# Patient Record
Sex: MALE
[De-identification: names, ages, dates, MRNs, and addresses within clinical notes are randomized per-mention and may not be internally consistent; named-entity substitution may affect disease eponyms.]

---

## 2018-05-01 ENCOUNTER — HOSPITAL ENCOUNTER (OUTPATIENT)
Dept: HOSPITAL 72 - SUR | Age: 38
Discharge: HOME | End: 2018-05-01
Payer: COMMERCIAL

## 2018-05-01 VITALS — SYSTOLIC BLOOD PRESSURE: 131 MMHG | DIASTOLIC BLOOD PRESSURE: 71 MMHG

## 2018-05-01 VITALS — SYSTOLIC BLOOD PRESSURE: 118 MMHG | DIASTOLIC BLOOD PRESSURE: 73 MMHG

## 2018-05-01 VITALS — HEIGHT: 68 IN | BODY MASS INDEX: 32.43 KG/M2 | WEIGHT: 214 LBS

## 2018-05-01 VITALS — DIASTOLIC BLOOD PRESSURE: 49 MMHG | SYSTOLIC BLOOD PRESSURE: 94 MMHG

## 2018-05-01 VITALS — SYSTOLIC BLOOD PRESSURE: 106 MMHG | DIASTOLIC BLOOD PRESSURE: 54 MMHG

## 2018-05-01 VITALS — DIASTOLIC BLOOD PRESSURE: 42 MMHG | SYSTOLIC BLOOD PRESSURE: 96 MMHG

## 2018-05-01 VITALS — SYSTOLIC BLOOD PRESSURE: 92 MMHG | DIASTOLIC BLOOD PRESSURE: 41 MMHG

## 2018-05-01 VITALS — DIASTOLIC BLOOD PRESSURE: 94 MMHG | SYSTOLIC BLOOD PRESSURE: 129 MMHG

## 2018-05-01 VITALS — DIASTOLIC BLOOD PRESSURE: 44 MMHG | SYSTOLIC BLOOD PRESSURE: 94 MMHG

## 2018-05-01 VITALS — SYSTOLIC BLOOD PRESSURE: 139 MMHG | DIASTOLIC BLOOD PRESSURE: 92 MMHG

## 2018-05-01 VITALS — DIASTOLIC BLOOD PRESSURE: 85 MMHG | SYSTOLIC BLOOD PRESSURE: 148 MMHG

## 2018-05-01 DIAGNOSIS — I10: ICD-10-CM

## 2018-05-01 DIAGNOSIS — M75.42: ICD-10-CM

## 2018-05-01 DIAGNOSIS — M19.012: ICD-10-CM

## 2018-05-01 DIAGNOSIS — M75.112: Primary | ICD-10-CM

## 2018-05-01 PROCEDURE — 29823 SHO ARTHRS SRG XTNSV DBRDMT: CPT

## 2018-05-01 PROCEDURE — 29824 SHO ARTHRS SRG DSTL CLAVICLC: CPT

## 2018-05-01 NOTE — IMMEDIATE POST-OP EVALUATION
Immediate Post-Op Evalulation


Immediate Post-Op Evalulation


Procedure:  Left shoulder arthroscopy, sabina repair


Date of Evaluation:  May 1, 2018


Time of Evaluation:  10:18


IV Fluids:  1.2L


Blood Products:  0


Estimated Blood Loss:  min


Urinary Output:  0


Blood Pressure Systolic:  96


Blood Pressure Diastolic:  42


Pulse Rate:  71


Respiratory Rate:  16


O2 Sat by Pulse Oximetry:  99


Temperature (Fahrenheit):  97


Pain Score (1-10):  0


Nausea:  No


Vomiting:  No


Complications


0


Patient Status:  awake, reacts, patent, none


Hydration Status:  adequate


Drug:  Ancef 2g


Given Within 1 Hr of Incision:  Yes


Time Given:  08:30











JUAN BEASLEY M.D. May 1, 2018 08:57

## 2018-05-01 NOTE — BRIEF OPERATIVE NOTE
Immediate Post Operative Note


Operative Note


Chief Complaint:  left shoulder pain


Pre-op Diagnosis:


left shoulder impingement


Procedure:


left shoulder scope, sad, full sabina, rtc debridement


Post-op Diagnosis:  same as pre-op


Findings:  consistent w/pre-op dx studies


Surgeon:  md tricia


Assistant:  victor m arreola


Anesthesiologist:  md patti


Anesthesia:  general, regional


Specimen:  none


Complications:  none


Condition:  stable


Fluids:  ns


Estimated Blood Loss:  minimal


Drains:  none


Implant(s) used?:  No











KARIN ARREOLA May 1, 2018 10:00

## 2018-05-01 NOTE — OPERATIVE NOTE - DICTATED
DATE OF OPERATION:  05/01/2018



PREOPERATIVE DIAGNOSES:

1. Left shoulder subacromial impingement.

2. Left shoulder possible rotator cuff tear.

3. Left shoulder AC joint arthritis and pain.



POSTOPERATIVE DIAGNOSES:

1. Left shoulder articular-sided rotator cuff tear involving 15% rotator

cuff.

2. Left shoulder subacromial impingement and bursitis with a large bone

spur.

3. Left shoulder AC joint arthritis.



PROCEDURE:

1. Left shoulder arthroscopy and extensive intra-articular shaving.

2. Left shoulder debridement of the articular-sided rotator cuff tear.

3. Left shoulder subacromial bursoscopy, bursectomy, and subacromial

decompression.

4. Left shoulder full-Roya procedure.



SURGEON:  Augustus العراقي M.D.



ASSISTANT:  Catherine Hameed PA-C.



Assistant was present during the actual operative portion of the case

and was important and essential part of the operation.  During the

operation, the assistant held and operated the arthroscopic camera for

visualization, assisted by manipulating the arm to help with

visualization, and helped with essential parts of the repair process as

necessary such as operating surgical instruments under surgeon

supervision, suture management, and wound closures.



ANESTHESIOLOGIST:   _____.



ANESTHESIA:  LMA anesthesia.



ESTIMATED BLOOD LOSS:  Minimal.



COMPLICATIONS:  None.



SURGICAL INDICATION:  The patient is a 37-year-old male, who sustained the

above injury to his shoulder.  The patient was treated non-operative

initially, but this did not alleviate the patients symptoms.  Therefore,

after discussing all non-surgical and surgical options, and discussing all

foreseeable risk and benefits of surgery, the patient opted for surgical

treatment as described above.



PATIENT POSITIONING:  The patient was brought to the operating room table

and was placed on the operating room table.  All pressure points were well

padded.  General anesthesia was induced and patient was then placed in the

lateral decubitus position.  All pressure points were well padded again

and an axillary roll was placed.  Patient shoulder was then prepped and

draped in the usual sterile fashion.  Time out was performed and the

appropriate preoperative antibiotic was given by the anesthesiologist.



EXAMINATION OF SHOULDER UNDER ANESTHESIA:  The shoulder was examined under

anesthesia with all muscles well relaxed.  The shoulder was forward

flexed, abducted and was placed through full range of external and

internal rotation.  The anterior, posterior, and inferior stability of the

shoulder was checked.  The exam revealed no evidence of adhesive

capsulitis and no evidence of instability.



PORTAL PLACEMENT:  The posterior portal was established 2 cm inferior and 1

cm medial to the edge of the posterior acromion.  1 cm skin incision was

made using an eleven blade and using the blunt obturator, the cannula was

gently placed through the capsule.  The mid-glenoid portal was established

just lateral to the coracoid process under direct visualization. Direction

of the cannula was first established using a spinal needle, and

subsequently, the cannula was placed through the capsule with a blunt

obturator.



DIAGNOSTIC ARTHROSCOPY:  The biceps tendon was probed and pulled through

the joint for visualization.  It appeared normal.  The biceps anchor was

palpated with a probe and was visualized.  It appeared well attached and

there was no evidence of SLAP tear.  The posterior labrum and axillary

recess was visualized.  This was normal and there was no evidence of loose

cartilage or fragments in this area.  The glenoid articular surface was

visualized and it appeared normal.  The articular surface of the rotator

cuff was visualized and probed next.  There was articular-sided rotator

cuff tear at the leading edge involving 15% of the articular-sided rotator

cuff.   The Humeral head articular surface was then visualized.  There was

no evidence of articular cartilage damage.  Next the anterior labrum,

middle glenohumeral ligament, subscapularis tendon, and the anterior

inferior glenohumeral ligament were evaluated.  These structures were

completely normal.



At this point, the scope was moved to the mid-glenoid portal and the

posterior structures including the posterior labrum, posterior capsule and

posterior cuff were visualized.  These structures were completely normal.

The subscapularis recess was devoid of any loose bodies and the anterior

capsule was well attached to the humeral neck.  The middle and anterior

inferior glenohumeral ligament was visualized.  These structures were

completely normal.



OPERATIVE DEBRIDEMENTS AND REPAIR:  Care was given to all partial thickness

tears and frayed structures in the shoulder joint.  The frayed rotator

cuff and labrum was debrided using a shaver initially through the anterior

portal and subsequently through the posterior portal to complete the

debridement.  This allowed for smooth debridement of all affected

structures and all loose fragments were removed.



DIAGNOSTIC BURSOSCOPY AND SUBACROMIAL DECOMPRESSION:  The subacromial bursa

was entered from the posterior portal.  The anterior portal was

established under the CA ligament using a switching stick.  Subacromial

arthroscopy was initiated.  There was extensive bursitis and thickened and

inflamed bursa tissue present.  The CA ligament appeared to be scuffed and

frayed.  The shaver was placed through the anterior cannula and

debridement of the hypertrophic bursa tissue was accomplished.  Once

visualization was adequate, a lateral portal was established using a blunt

trochar in the mid portion of the acromion bone in the anterior-posterior

direction and approximately 2 cm lateral to the lateral edge of the

acromion.  Using combination of shaver and electrocautery the CA ligament

was released from the undersurface of the acromion and a complete

bursectomy was accomplished.  At this point, a subacromial decompression

was performed using a molina initially taking off 5-8 mm of the

anterolateral edge of the acromion from the lateral portal and viewing

from the posterior portal.  Then the lateral border of the undersurface of

the acromion was decompressed to the same dept as the anterolateral edge.

A posterior trough was then created in the acromion in line with the

posterior edge of the clavicle.  At this point, the scope was placed in

the lateral portal and the subacromial decompression was performed from

the posterior portal decompressing the undersurface of the  acromion to

dept of 5-8 mm.  The decompression was performed anterior to the

previously marked trough all the way medially to the level of the AC

joint.  At all times, care was given not to take off too much bone in

order to avoid risk of fracture of the acromion.  An excellent subacromial

decompression was performed in this fashion.  At this point, the bursal

side of the rotator cuff was examined.  All the bursa over the rotator

cuff was removed and the rotator cuff was examined with a probe.  The arm

was placed into external rotation, neutral, and then internal rotation and

there was no evidence of tear of the rotator cuff.  The scope was then

placed in the posterior portal and the subacromial decompression was

rechecked to assure there is no area of bone spur that would be still

impinging onto the rotator cuff.



EVALUATION OF DISTAL CLAVICLE AND DISTAL CLAVICLE RESECTION:  Care was

given to the distal end of the clavicle.  Using electrocautery and

teresa, the distal end of the bursa and soft tissue around the distal end

of the clavicle was debrided and cleaned.  Care was given not to inflict

excessive trauma to the ligaments of the AC joint.  The distal end of the

clavicle appeared to have an inferior osteophyte extending down well

bellow the level of the acromion at the level of the AC joint.  This

appeared to be impinging onto the supraspinatus muscle belly and the

musculotendinous junction of the rotator cuff.  A full-Roay procedure

was performed by using a molina to resect the inferior 30% of the distal end

of the clavicle.  This decompression allowed space for the inferior

structures to slide without impingement.  This co-plained the inferior

edge of the distal clavicle with the inferior edge of the acromion.



CONDITION AT DISCHARGE FROM OPERATING ROOM:  The skin was re-approximated

and sterile dressing and sling were applied.  All lap counts and

instrument counts were correct.  The patient tolerated the procedure well

without complications and was taken to the recovery room in stable

conditions.









  ______________________________________________

  Augustus العراقي M.D.





DR:  CORDELIA

D:  05/01/2018 10:04

T:  05/02/2018 03:13

JOB#:  [id number]

CC:

## 2018-05-01 NOTE — 48 HOUR POST ANESTHESIA EVAL
Post Anesthesia Evaluation


Procedure:  Left shoulder arthroscopy, sabina repair


Date of Evaluation:  May 1, 2018


Time of Evaluation:  11:25


Blood Pressure Systolic:  113


0:  88


Pulse Rate:  88


Respiratory Rate:  21


Temperature (Fahrenheit):  98.3


O2 Sat by Pulse Oximetry:  100


Airway:  patent


Nausea:  No


Vomiting:  No


Pain Intensity:  0


Hydration Status:  adequate


Cardiopulmonary Status:


at baseline


Mental Status/LOC:  patient returned to baseline


Post-Anesthesia Complications:


0


Follow-up care needed:  ready to discharge











JUAN BEASLEY M.D. May 1, 2018 08:58

## 2018-05-01 NOTE — PRE-PROCEDURE NOTE/ATTESTATION
Pre-Procedure Note/Attestation


Complete Prior to Procedure


Planned Procedure:  left


Procedure Narrative:


left shoulder scope, sad, full sabina, debridement vs repair of rtc





Indications for Procedure


Pre-Operative Diagnosis:


left shoulder impingement





Attestation


I attest that I discussed the nature of the procedure; its benefits; risks and 

complications; and alternatives (and the risks and benefits of such alternatives

), prior to the procedure, with the patient (or the patient's legal 

representative).





I attest that, if there was a reasonable possibility of needing a blood 

transfusion, the patient (or the patient's legal representative) was given the 

[place] [employer] standardized written summary, pursuant 

to the Brijesh Gloria Blood Safety Act ([place] Health and Safety Code # 1645, as 

amended).





I attest that I re-evaluated the patient just prior to the surgery and that 

there has been no change in the patient's H&P, except as documented below: none











MYRNA LAZO May 1, 2018 07:05

## 2018-05-01 NOTE — ANETHESIA PREOPERATIVE EVAL
Anesthesia Pre-op PMH/ROS


General


Date of Evaluation:  May 1, 2018


Anesthesiologist:  [place]


ASA Score:  ASA 2


Mallampati Score


Class I : Soft palate, uvula, fauces, pillars visible


Class II: Soft palate, uvula, fauces visible


Class III: Soft palate, base of uvula visible


Class IV: Only hard plate visible


Mallampati Classification:  Class III


Surgeon:  Dulce Maria


Diagnosis:  Left rotator cuff tear


Surgical Procedure:  Left shoulder arthroscopy, mini sabina, rotator cuff 

repair


Anesthesia History:  none


Family History:  no anesthesia problems


Allergies:  


Coded Allergies:  


     No Known Allergies (Unverified , 4/30/18)


Medications:  see eMAR





Past Medical History


Cardiovascular:  Reports: HTN; 


   Denies: CAD, MI, valve dz, arrhythmia, other


Pulmonary:  Denies: asthma, COPD, DONN, other


Gastrointestinal/Genitourinary:  Denies: GERD, CRI, ESRD, other


Neurologic/Psychiatric:  Denies: dementia, CVA, depression/anxiety, TIA, other


Endocrine:  Denies: DM, hypothyroidism, steroids, other


HEENT:  Denies: cataract (L), cataract (R), glaucoma, [place] (L), [place] (R), other


Hematology/Immune:  Denies: anemia, DVT, bleeding disorder, other


Musculoskeletal/Integumentary:  Reports: OA; 


   Denies: RA, DJD, DDD, edema, other


Other:  obesity


PSxH Narrative:


right knee arthroscopy, anal fistula surgery, bilateral breast reduction surgery





Anesthesia Pre-op Phys. Exam


Physician Exam


see chart


Constitutional:  NAD


Cardiovascular:  RRR


Respiratory:  CTA





Airway Exam


Mallampati Score:  Class II


MO:  full


ROM:  full


Teeth:  intact





Anesthesia Pre-op A/P


Labs


see chart





Studies


Pre-op Studies:  EKG - sr





Risk Assessment & Plan


Assessment:


ASA II


Plan:


GA, left interscalene nerve block


Status Change Before Surgery:  No





Pre-Antibiotics


Drug:  Ancef 2g


Given Within 1 Hr of Incision:  Yes


Time Given:  08:30











JUAN BEASLEY M.D. May 1, 2018 07:18